# Patient Record
Sex: FEMALE | Race: WHITE | NOT HISPANIC OR LATINO | Employment: FULL TIME | URBAN - METROPOLITAN AREA
[De-identification: names, ages, dates, MRNs, and addresses within clinical notes are randomized per-mention and may not be internally consistent; named-entity substitution may affect disease eponyms.]

---

## 2017-06-16 ENCOUNTER — COMMUNICATION - HEALTHEAST (OUTPATIENT)
Dept: FAMILY MEDICINE | Facility: CLINIC | Age: 37
End: 2017-06-16

## 2017-06-19 ENCOUNTER — COMMUNICATION - HEALTHEAST (OUTPATIENT)
Dept: FAMILY MEDICINE | Facility: CLINIC | Age: 37
End: 2017-06-19

## 2017-06-19 ENCOUNTER — OFFICE VISIT - HEALTHEAST (OUTPATIENT)
Dept: FAMILY MEDICINE | Facility: CLINIC | Age: 37
End: 2017-06-19

## 2017-06-19 DIAGNOSIS — Z76.89 ENCOUNTER TO ESTABLISH CARE: ICD-10-CM

## 2017-06-19 DIAGNOSIS — F41.9 MODERATE ANXIETY: ICD-10-CM

## 2017-06-19 ASSESSMENT — MIFFLIN-ST. JEOR: SCORE: 1427.49

## 2017-07-05 ENCOUNTER — OFFICE VISIT - HEALTHEAST (OUTPATIENT)
Dept: FAMILY MEDICINE | Facility: CLINIC | Age: 37
End: 2017-07-05

## 2017-07-05 DIAGNOSIS — F41.9 MODERATE ANXIETY: ICD-10-CM

## 2017-07-05 ASSESSMENT — MIFFLIN-ST. JEOR: SCORE: 1431.57

## 2017-07-08 ENCOUNTER — RECORDS - HEALTHEAST (OUTPATIENT)
Dept: ADMINISTRATIVE | Facility: OTHER | Age: 37
End: 2017-07-08

## 2017-07-10 ENCOUNTER — COMMUNICATION - HEALTHEAST (OUTPATIENT)
Dept: ADMINISTRATIVE | Facility: CLINIC | Age: 37
End: 2017-07-10

## 2017-08-07 ENCOUNTER — OFFICE VISIT - HEALTHEAST (OUTPATIENT)
Dept: FAMILY MEDICINE | Facility: CLINIC | Age: 37
End: 2017-08-07

## 2017-08-07 DIAGNOSIS — F41.9 MODERATE ANXIETY: ICD-10-CM

## 2017-08-07 ASSESSMENT — MIFFLIN-ST. JEOR: SCORE: 1422.96

## 2017-09-11 ENCOUNTER — OFFICE VISIT - HEALTHEAST (OUTPATIENT)
Dept: FAMILY MEDICINE | Facility: CLINIC | Age: 37
End: 2017-09-11

## 2017-09-11 DIAGNOSIS — F41.9 MODERATE ANXIETY: ICD-10-CM

## 2017-09-11 ASSESSMENT — MIFFLIN-ST. JEOR: SCORE: 1417.97

## 2017-10-09 ENCOUNTER — COMMUNICATION - HEALTHEAST (OUTPATIENT)
Dept: FAMILY MEDICINE | Facility: CLINIC | Age: 37
End: 2017-10-09

## 2017-10-09 DIAGNOSIS — F41.9 MODERATE ANXIETY: ICD-10-CM

## 2018-04-01 ENCOUNTER — COMMUNICATION - HEALTHEAST (OUTPATIENT)
Dept: FAMILY MEDICINE | Facility: CLINIC | Age: 38
End: 2018-04-01

## 2018-04-01 DIAGNOSIS — F41.9 MODERATE ANXIETY: ICD-10-CM

## 2018-06-13 ENCOUNTER — COMMUNICATION - HEALTHEAST (OUTPATIENT)
Dept: SCHEDULING | Facility: CLINIC | Age: 38
End: 2018-06-13

## 2018-06-14 ENCOUNTER — COMMUNICATION - HEALTHEAST (OUTPATIENT)
Dept: TELEHEALTH | Facility: CLINIC | Age: 38
End: 2018-06-14

## 2018-06-14 ENCOUNTER — OFFICE VISIT - HEALTHEAST (OUTPATIENT)
Dept: FAMILY MEDICINE | Facility: CLINIC | Age: 38
End: 2018-06-14

## 2018-06-14 DIAGNOSIS — R10.9 ABDOMINAL DISCOMFORT: ICD-10-CM

## 2018-06-14 LAB
ERYTHROCYTE [DISTWIDTH] IN BLOOD BY AUTOMATED COUNT: 11.5 % (ref 11–14.5)
HCT VFR BLD AUTO: 44.2 % (ref 35–47)
HGB BLD-MCNC: 14.4 G/DL (ref 12–16)
MCH RBC QN AUTO: 30.8 PG (ref 27–34)
MCHC RBC AUTO-ENTMCNC: 32.7 G/DL (ref 32–36)
MCV RBC AUTO: 94 FL (ref 80–100)
PLATELET # BLD AUTO: 221 THOU/UL (ref 140–440)
PMV BLD AUTO: 8.1 FL (ref 7–10)
RBC # BLD AUTO: 4.69 MILL/UL (ref 3.8–5.4)
WBC: 5.8 THOU/UL (ref 4–11)

## 2018-06-15 ENCOUNTER — AMBULATORY - HEALTHEAST (OUTPATIENT)
Dept: LAB | Facility: CLINIC | Age: 38
End: 2018-06-15

## 2018-06-15 DIAGNOSIS — R10.9 ABDOMINAL DISCOMFORT: ICD-10-CM

## 2018-06-15 LAB
25(OH)D3 SERPL-MCNC: 24.1 NG/ML (ref 30–80)
ALBUMIN SERPL-MCNC: 4 G/DL (ref 3.5–5)
ALP SERPL-CCNC: 53 U/L (ref 45–120)
ALT SERPL W P-5'-P-CCNC: 35 U/L (ref 0–45)
ANION GAP SERPL CALCULATED.3IONS-SCNC: 8 MMOL/L (ref 5–18)
AST SERPL W P-5'-P-CCNC: 28 U/L (ref 0–40)
BILIRUB SERPL-MCNC: 0.5 MG/DL (ref 0–1)
BUN SERPL-MCNC: 13 MG/DL (ref 8–22)
CALCIUM SERPL-MCNC: 9.4 MG/DL (ref 8.5–10.5)
CHLORIDE BLD-SCNC: 105 MMOL/L (ref 98–107)
CO2 SERPL-SCNC: 27 MMOL/L (ref 22–31)
CREAT SERPL-MCNC: 0.75 MG/DL (ref 0.6–1.1)
GFR SERPL CREATININE-BSD FRML MDRD: >60 ML/MIN/1.73M2
GLUCOSE BLD-MCNC: 76 MG/DL (ref 70–125)
POTASSIUM BLD-SCNC: 4.4 MMOL/L (ref 3.5–5)
PROT SERPL-MCNC: 7 G/DL (ref 6–8)
SODIUM SERPL-SCNC: 140 MMOL/L (ref 136–145)
TSH SERPL DL<=0.005 MIU/L-ACNC: 2.07 UIU/ML (ref 0.3–5)

## 2018-06-17 LAB — G LAMBLIA AG STL QL IA: NORMAL

## 2018-06-18 ENCOUNTER — COMMUNICATION - HEALTHEAST (OUTPATIENT)
Dept: INTERNAL MEDICINE | Facility: CLINIC | Age: 38
End: 2018-06-18

## 2018-06-18 ENCOUNTER — AMBULATORY - HEALTHEAST (OUTPATIENT)
Dept: FAMILY MEDICINE | Facility: CLINIC | Age: 38
End: 2018-06-18

## 2018-06-18 LAB
H PYLORI AG STL QL IA: NORMAL
O+P STL MICRO: NORMAL
REPORT STATUS: NORMAL
SPECIMEN DESCRIPTION: NORMAL

## 2018-06-19 ENCOUNTER — COMMUNICATION - HEALTHEAST (OUTPATIENT)
Dept: FAMILY MEDICINE | Facility: CLINIC | Age: 38
End: 2018-06-19

## 2018-06-19 ENCOUNTER — COMMUNICATION - HEALTHEAST (OUTPATIENT)
Dept: LAB | Facility: CLINIC | Age: 38
End: 2018-06-19

## 2018-06-19 DIAGNOSIS — Z00.00 ANNUAL PHYSICAL EXAM: ICD-10-CM

## 2018-06-19 LAB
CHOLEST SERPL-MCNC: 144 MG/DL
HDLC SERPL-MCNC: 51 MG/DL
LDLC SERPL CALC-MCNC: 84 MG/DL
TRIGL SERPL-MCNC: 45 MG/DL

## 2018-07-03 ENCOUNTER — OFFICE VISIT - HEALTHEAST (OUTPATIENT)
Dept: FAMILY MEDICINE | Facility: CLINIC | Age: 38
End: 2018-07-03

## 2018-07-03 DIAGNOSIS — R30.0 DYSURIA: ICD-10-CM

## 2018-07-03 LAB
ALBUMIN UR-MCNC: NEGATIVE MG/DL
APPEARANCE UR: CLEAR
BACTERIA #/AREA URNS HPF: ABNORMAL HPF
BILIRUB UR QL STRIP: NEGATIVE
COLOR UR AUTO: YELLOW
GLUCOSE UR STRIP-MCNC: NEGATIVE MG/DL
HGB UR QL STRIP: NEGATIVE
KETONES UR STRIP-MCNC: NEGATIVE MG/DL
LEUKOCYTE ESTERASE UR QL STRIP: ABNORMAL
NITRATE UR QL: NEGATIVE
PH UR STRIP: 6.5 [PH] (ref 5–8)
RBC #/AREA URNS AUTO: ABNORMAL HPF
SP GR UR STRIP: <=1.005 (ref 1–1.03)
SQUAMOUS #/AREA URNS AUTO: ABNORMAL LPF
UROBILINOGEN UR STRIP-ACNC: ABNORMAL
WBC #/AREA URNS AUTO: ABNORMAL HPF

## 2018-07-04 LAB — BACTERIA SPEC CULT: NO GROWTH

## 2018-07-06 ENCOUNTER — COMMUNICATION - HEALTHEAST (OUTPATIENT)
Dept: FAMILY MEDICINE | Facility: CLINIC | Age: 38
End: 2018-07-06

## 2018-07-07 ENCOUNTER — COMMUNICATION - HEALTHEAST (OUTPATIENT)
Dept: SCHEDULING | Facility: CLINIC | Age: 38
End: 2018-07-07

## 2018-07-07 DIAGNOSIS — R10.84 ABDOMINAL PAIN, GENERALIZED: ICD-10-CM

## 2018-07-12 ENCOUNTER — COMMUNICATION - HEALTHEAST (OUTPATIENT)
Dept: FAMILY MEDICINE | Facility: CLINIC | Age: 38
End: 2018-07-12

## 2018-07-12 ENCOUNTER — OFFICE VISIT - HEALTHEAST (OUTPATIENT)
Dept: FAMILY MEDICINE | Facility: CLINIC | Age: 38
End: 2018-07-12

## 2018-07-12 DIAGNOSIS — R10.9 ABDOMINAL DISCOMFORT: ICD-10-CM

## 2018-07-12 ASSESSMENT — MIFFLIN-ST. JEOR: SCORE: 1356.28

## 2018-07-13 ENCOUNTER — COMMUNICATION - HEALTHEAST (OUTPATIENT)
Dept: FAMILY MEDICINE | Facility: CLINIC | Age: 38
End: 2018-07-13

## 2018-07-13 ENCOUNTER — RECORDS - HEALTHEAST (OUTPATIENT)
Dept: ADMINISTRATIVE | Facility: OTHER | Age: 38
End: 2018-07-13

## 2018-07-17 ENCOUNTER — RECORDS - HEALTHEAST (OUTPATIENT)
Dept: ADMINISTRATIVE | Facility: OTHER | Age: 38
End: 2018-07-17

## 2018-07-20 ENCOUNTER — RECORDS - HEALTHEAST (OUTPATIENT)
Dept: ADMINISTRATIVE | Facility: OTHER | Age: 38
End: 2018-07-20

## 2018-07-23 ENCOUNTER — RECORDS - HEALTHEAST (OUTPATIENT)
Dept: ADMINISTRATIVE | Facility: OTHER | Age: 38
End: 2018-07-23

## 2018-10-08 ENCOUNTER — COMMUNICATION - HEALTHEAST (OUTPATIENT)
Dept: FAMILY MEDICINE | Facility: CLINIC | Age: 38
End: 2018-10-08

## 2018-10-08 DIAGNOSIS — F41.9 MODERATE ANXIETY: ICD-10-CM

## 2018-11-18 ENCOUNTER — RECORDS - HEALTHEAST (OUTPATIENT)
Dept: ADMINISTRATIVE | Facility: OTHER | Age: 38
End: 2018-11-18

## 2018-12-05 ENCOUNTER — OFFICE VISIT - HEALTHEAST (OUTPATIENT)
Dept: FAMILY MEDICINE | Facility: CLINIC | Age: 38
End: 2018-12-05

## 2018-12-05 DIAGNOSIS — F41.9 MODERATE ANXIETY: ICD-10-CM

## 2018-12-05 DIAGNOSIS — Z23 NEED FOR INFLUENZA VACCINATION: ICD-10-CM

## 2018-12-17 ENCOUNTER — OFFICE VISIT - HEALTHEAST (OUTPATIENT)
Dept: FAMILY MEDICINE | Facility: CLINIC | Age: 38
End: 2018-12-17

## 2018-12-17 DIAGNOSIS — F41.9 MODERATE ANXIETY: ICD-10-CM

## 2018-12-17 DIAGNOSIS — F33.1 MODERATE EPISODE OF RECURRENT MAJOR DEPRESSIVE DISORDER (H): ICD-10-CM

## 2018-12-17 RX ORDER — TRAZODONE HYDROCHLORIDE 50 MG/1
50 TABLET, FILM COATED ORAL AT BEDTIME
Status: SHIPPED | COMMUNITY
Start: 2018-12-17

## 2018-12-18 ENCOUNTER — COMMUNICATION - HEALTHEAST (OUTPATIENT)
Dept: FAMILY MEDICINE | Facility: CLINIC | Age: 38
End: 2018-12-18

## 2018-12-27 ENCOUNTER — OFFICE VISIT - HEALTHEAST (OUTPATIENT)
Dept: FAMILY MEDICINE | Facility: CLINIC | Age: 38
End: 2018-12-27

## 2018-12-27 DIAGNOSIS — R45.851 SUICIDAL IDEATION: ICD-10-CM

## 2018-12-27 DIAGNOSIS — Z09 HOSPITAL DISCHARGE FOLLOW-UP: ICD-10-CM

## 2018-12-27 DIAGNOSIS — F33.0 MILD EPISODE OF RECURRENT MAJOR DEPRESSIVE DISORDER (H): ICD-10-CM

## 2019-06-03 ENCOUNTER — OFFICE VISIT - HEALTHEAST (OUTPATIENT)
Dept: FAMILY MEDICINE | Facility: CLINIC | Age: 39
End: 2019-06-03

## 2019-06-03 DIAGNOSIS — F41.9 MODERATE ANXIETY: ICD-10-CM

## 2019-06-04 ENCOUNTER — COMMUNICATION - HEALTHEAST (OUTPATIENT)
Dept: FAMILY MEDICINE | Facility: CLINIC | Age: 39
End: 2019-06-04

## 2019-06-04 LAB — HBV SURFACE AB SERPL IA-ACNC: POSITIVE M[IU]/ML

## 2019-06-05 ENCOUNTER — COMMUNICATION - HEALTHEAST (OUTPATIENT)
Dept: FAMILY MEDICINE | Facility: CLINIC | Age: 39
End: 2019-06-05

## 2019-06-05 LAB — HAV IGG SER QL IA: NEGATIVE

## 2019-06-07 ENCOUNTER — AMBULATORY - HEALTHEAST (OUTPATIENT)
Dept: NURSING | Facility: CLINIC | Age: 39
End: 2019-06-07

## 2019-08-14 ENCOUNTER — RECORDS - HEALTHEAST (OUTPATIENT)
Dept: ADMINISTRATIVE | Facility: OTHER | Age: 39
End: 2019-08-14

## 2019-08-14 LAB
HBA1C MFR BLD: 5.1 % (ref 0–5.6)
HPV_EXT - HISTORICAL: NORMAL

## 2019-08-20 ENCOUNTER — RECORDS - HEALTHEAST (OUTPATIENT)
Dept: HEALTH INFORMATION MANAGEMENT | Facility: CLINIC | Age: 39
End: 2019-08-20

## 2019-11-11 ENCOUNTER — RECORDS - HEALTHEAST (OUTPATIENT)
Dept: ADMINISTRATIVE | Facility: OTHER | Age: 39
End: 2019-11-11

## 2019-11-27 ENCOUNTER — COMMUNICATION - HEALTHEAST (OUTPATIENT)
Dept: INTERNAL MEDICINE | Facility: CLINIC | Age: 39
End: 2019-11-27

## 2019-12-09 ENCOUNTER — AMBULATORY - HEALTHEAST (OUTPATIENT)
Dept: NURSING | Facility: CLINIC | Age: 39
End: 2019-12-09

## 2020-02-18 ENCOUNTER — HOSPITAL ENCOUNTER (OUTPATIENT)
Dept: MAMMOGRAPHY | Facility: CLINIC | Age: 40
Discharge: HOME | End: 2020-02-18
Attending: OBSTETRICS & GYNECOLOGY

## 2020-02-18 DIAGNOSIS — Z12.31 VISIT FOR SCREENING MAMMOGRAM: ICD-10-CM

## 2020-02-20 ENCOUNTER — OFFICE VISIT - HEALTHEAST (OUTPATIENT)
Dept: FAMILY MEDICINE | Facility: CLINIC | Age: 40
End: 2020-02-20

## 2020-02-20 DIAGNOSIS — M54.50 ACUTE LEFT-SIDED LOW BACK PAIN WITHOUT SCIATICA: ICD-10-CM

## 2020-02-20 RX ORDER — IBUPROFEN 600 MG/1
600 TABLET, FILM COATED ORAL EVERY 6 HOURS PRN
Qty: 60 TABLET | Refills: 2 | Status: SHIPPED | OUTPATIENT
Start: 2020-02-20 | End: 2022-04-06

## 2020-02-21 ENCOUNTER — COMMUNICATION - HEALTHEAST (OUTPATIENT)
Dept: SCHEDULING | Facility: CLINIC | Age: 40
End: 2020-02-21

## 2020-03-04 ENCOUNTER — RECORDS - HEALTHEAST (OUTPATIENT)
Dept: ADMINISTRATIVE | Facility: OTHER | Age: 40
End: 2020-03-04

## 2020-03-06 ENCOUNTER — HOSPITAL ENCOUNTER (OUTPATIENT)
Dept: MAMMOGRAPHY | Facility: CLINIC | Age: 40
Discharge: HOME | End: 2020-03-06
Attending: OBSTETRICS & GYNECOLOGY

## 2020-03-06 ENCOUNTER — HOSPITAL ENCOUNTER (OUTPATIENT)
Dept: ULTRASOUND IMAGING | Facility: CLINIC | Age: 40
Discharge: HOME | End: 2020-03-06
Attending: OBSTETRICS & GYNECOLOGY

## 2020-03-06 DIAGNOSIS — N64.89 BREAST ASYMMETRY: ICD-10-CM

## 2020-03-20 ENCOUNTER — RECORDS - HEALTHEAST (OUTPATIENT)
Dept: RADIOLOGY | Facility: CLINIC | Age: 40
End: 2020-03-20

## 2020-05-13 ENCOUNTER — COMMUNICATION - HEALTHEAST (OUTPATIENT)
Dept: FAMILY MEDICINE | Facility: CLINIC | Age: 40
End: 2020-05-13

## 2020-05-13 DIAGNOSIS — F41.9 MODERATE ANXIETY: ICD-10-CM

## 2020-09-02 ENCOUNTER — RECORDS - HEALTHEAST (OUTPATIENT)
Dept: ADMINISTRATIVE | Facility: OTHER | Age: 40
End: 2020-09-02

## 2020-09-21 ENCOUNTER — RECORDS - HEALTHEAST (OUTPATIENT)
Dept: ADMINISTRATIVE | Facility: OTHER | Age: 40
End: 2020-09-21

## 2020-10-13 ENCOUNTER — COMMUNICATION - HEALTHEAST (OUTPATIENT)
Dept: SCHEDULING | Facility: CLINIC | Age: 40
End: 2020-10-13

## 2020-10-13 ENCOUNTER — OFFICE VISIT - HEALTHEAST (OUTPATIENT)
Dept: FAMILY MEDICINE | Facility: CLINIC | Age: 40
End: 2020-10-13

## 2020-10-13 DIAGNOSIS — F41.9 MODERATE ANXIETY: ICD-10-CM

## 2020-10-13 DIAGNOSIS — F33.1 MODERATE EPISODE OF RECURRENT MAJOR DEPRESSIVE DISORDER (H): ICD-10-CM

## 2020-10-13 ASSESSMENT — PATIENT HEALTH QUESTIONNAIRE - PHQ9: SUM OF ALL RESPONSES TO PHQ QUESTIONS 1-9: 10

## 2020-10-23 ENCOUNTER — OFFICE VISIT - HEALTHEAST (OUTPATIENT)
Dept: FAMILY MEDICINE | Facility: CLINIC | Age: 40
End: 2020-10-23

## 2020-10-23 ENCOUNTER — COMMUNICATION - HEALTHEAST (OUTPATIENT)
Dept: SCHEDULING | Facility: CLINIC | Age: 40
End: 2020-10-23

## 2020-10-23 DIAGNOSIS — F33.42 MAJOR DEPRESSIVE DISORDER, RECURRENT EPISODE, IN FULL REMISSION (H): ICD-10-CM

## 2020-10-23 DIAGNOSIS — R11.0 NAUSEA WITHOUT VOMITING: ICD-10-CM

## 2020-10-23 RX ORDER — ONDANSETRON 4 MG/1
4 TABLET, ORALLY DISINTEGRATING ORAL EVERY 8 HOURS PRN
Qty: 30 TABLET | Refills: 1 | Status: SHIPPED | OUTPATIENT
Start: 2020-10-23 | End: 2021-09-13

## 2020-10-27 ENCOUNTER — OFFICE VISIT - HEALTHEAST (OUTPATIENT)
Dept: FAMILY MEDICINE | Facility: CLINIC | Age: 40
End: 2020-10-27

## 2020-10-27 DIAGNOSIS — R11.0 NAUSEA: ICD-10-CM

## 2020-11-02 ENCOUNTER — OFFICE VISIT - HEALTHEAST (OUTPATIENT)
Dept: FAMILY MEDICINE | Facility: CLINIC | Age: 40
End: 2020-11-02

## 2020-11-02 ENCOUNTER — COMMUNICATION - HEALTHEAST (OUTPATIENT)
Dept: SCHEDULING | Facility: CLINIC | Age: 40
End: 2020-11-02

## 2020-11-02 DIAGNOSIS — F32.81 PMDD (PREMENSTRUAL DYSPHORIC DISORDER): ICD-10-CM

## 2020-11-02 DIAGNOSIS — R11.0 NAUSEA: ICD-10-CM

## 2020-11-02 DIAGNOSIS — F41.9 ANXIETY: ICD-10-CM

## 2020-12-04 ENCOUNTER — RECORDS - HEALTHEAST (OUTPATIENT)
Dept: ADMINISTRATIVE | Facility: OTHER | Age: 40
End: 2020-12-04

## 2021-01-24 ENCOUNTER — RECORDS - HEALTHEAST (OUTPATIENT)
Dept: ADMINISTRATIVE | Facility: OTHER | Age: 41
End: 2021-01-24

## 2021-01-27 ENCOUNTER — COMMUNICATION - HEALTHEAST (OUTPATIENT)
Dept: ADMINISTRATIVE | Facility: CLINIC | Age: 41
End: 2021-01-27

## 2021-01-27 DIAGNOSIS — F33.1 MODERATE EPISODE OF RECURRENT MAJOR DEPRESSIVE DISORDER (H): ICD-10-CM

## 2021-01-27 DIAGNOSIS — F41.9 MODERATE ANXIETY: ICD-10-CM

## 2021-01-27 RX ORDER — BUPROPION HYDROCHLORIDE 300 MG/1
300 TABLET ORAL EVERY MORNING
Qty: 90 TABLET | Refills: 3 | Status: SHIPPED | OUTPATIENT
Start: 2021-01-27

## 2021-01-27 RX ORDER — LEVONORGESTREL/ETHIN.ESTRADIOL 0.1-0.02MG
1 TABLET ORAL DAILY
Qty: 3 PACKAGE | Refills: 3 | Status: SHIPPED | OUTPATIENT
Start: 2021-01-27 | End: 2021-09-13

## 2021-01-27 RX ORDER — HYDROXYZINE PAMOATE 25 MG/1
25 CAPSULE ORAL AT BEDTIME
Qty: 90 CAPSULE | Refills: 1 | Status: SHIPPED | OUTPATIENT
Start: 2021-01-27

## 2021-02-10 ENCOUNTER — RECORDS - HEALTHEAST (OUTPATIENT)
Dept: ADMINISTRATIVE | Facility: OTHER | Age: 41
End: 2021-02-10

## 2021-05-04 ENCOUNTER — HOSPITAL ENCOUNTER (OUTPATIENT)
Dept: MAMMOGRAPHY | Facility: CLINIC | Age: 41
Discharge: HOME | End: 2021-05-04
Attending: OBSTETRICS & GYNECOLOGY

## 2021-05-04 DIAGNOSIS — Z12.31 VISIT FOR SCREENING MAMMOGRAM: ICD-10-CM

## 2021-05-24 ENCOUNTER — RECORDS - HEALTHEAST (OUTPATIENT)
Dept: ADMINISTRATIVE | Facility: CLINIC | Age: 41
End: 2021-05-24

## 2021-05-25 ENCOUNTER — RECORDS - HEALTHEAST (OUTPATIENT)
Dept: ADMINISTRATIVE | Facility: CLINIC | Age: 41
End: 2021-05-25

## 2021-05-26 ASSESSMENT — PATIENT HEALTH QUESTIONNAIRE - PHQ9: SUM OF ALL RESPONSES TO PHQ QUESTIONS 1-9: 10

## 2021-05-29 NOTE — TELEPHONE ENCOUNTER
Patient Returning Call  Reason for call:  Returning phone call.  Information relayed to patient:  Below message relayed to patient.  Patient has additional questions:  No  If YES, what are your questions/concerns: No additional questions at this time.  Okay to leave a detailed message?: No call back needed

## 2021-05-29 NOTE — TELEPHONE ENCOUNTER
Patient does want Hep A vaccine, order pended. Patient will be coming in on 6/7/19 at 8:00AM for nurse only visit.

## 2021-05-29 NOTE — PROGRESS NOTES
Assessment:   1. Moderate anxiety  Stable. Continue with current medication.   - buPROPion (WELLBUTRIN XL) 150 MG 24 hr tablet; Take 1 tablet (150 mg total) by mouth every morning.  Dispense: 90 tablet; Refill: 3  - Hepatitis B Surface Antibody (Anti-HBs) Vaccine Check  - Hepatitis A Immune Status     Plan:   Medications: Wellbutrin.  Continue with counseling.  Reviewed concept of anxiety as biochemical imbalance of neurotransmitters and rationale for treatment.  Instructed patient to contact office or on-call physician promptly should condition worsen or any new symptoms appear and provided on-call telephone numbers. IF THE PATIENT HAS ANY SUICIDAL OR HOMICIDAL IDEATIONS, CALL THE OFFICE, DISCUSS WITH A SUPPORT MEMBER, OR GO TO THE ER IMMEDIATELY. Patient was agreeable with this plan.  Follow up: 6 months.     Subjective:   Sofia Fletcher is a 38 y.o. female who presents for follow up of anxiety disorder. Current symptoms: none. She has continued with therapy and she is doing well at therapy. She sees her therapy few times a month. She denies current suicidal and homicidal ideation. She complains of the following side effects from the treatment: none. She will like to know if she is vaccinated against Hepatitis A and B due to a recent job change.    The following portions of the patient's history were reviewed and updated as appropriate: allergies, current medications, past family history, past medical history, past social history, past surgical history and problem list.      Objective:      /76   Pulse 62   Wt 162 lb 5 oz (73.6 kg)   SpO2 100%   BMI 28.75 kg/m     General:  alert, appears stated age and cooperative   Affect/Behavior:  full facial expressions, good grooming, good insight, normal perception, normal reasoning, normal speech pattern and content and normal thought patterns

## 2021-05-29 NOTE — PROGRESS NOTES
Patient is here for her Hep A vaccine. Patient dose have an Allergy to the allin family, per Arabella Martinez. Ok to give.     Rosa Hager CMA  8:05 AM  6/7/2019

## 2021-05-29 NOTE — TELEPHONE ENCOUNTER
----- Message from SHILO Sprague sent at 6/4/2019  4:27 PM CDT -----  You are immune against hepatitis B.  Still awaiting hepatitis A result.

## 2021-05-29 NOTE — TELEPHONE ENCOUNTER
----- Message from SHILO Sprague sent at 6/5/2019 10:01 AM CDT -----  Negative for Hepatitis A. Let us know if you want to receive immunization for Hepatitis A.

## 2021-05-31 VITALS — WEIGHT: 171.7 LBS | HEIGHT: 63 IN | BODY MASS INDEX: 30.42 KG/M2

## 2021-05-31 VITALS — BODY MASS INDEX: 30.76 KG/M2 | HEIGHT: 63 IN | WEIGHT: 173.6 LBS

## 2021-05-31 VITALS — HEIGHT: 63 IN | BODY MASS INDEX: 30.23 KG/M2 | WEIGHT: 170.6 LBS

## 2021-05-31 VITALS — BODY MASS INDEX: 30.6 KG/M2 | WEIGHT: 172.7 LBS | HEIGHT: 63 IN

## 2021-06-01 VITALS — WEIGHT: 157 LBS | BODY MASS INDEX: 27.81 KG/M2

## 2021-06-01 VITALS — WEIGHT: 158 LBS | BODY MASS INDEX: 27.99 KG/M2

## 2021-06-01 VITALS — BODY MASS INDEX: 27.82 KG/M2 | WEIGHT: 157 LBS | HEIGHT: 63 IN

## 2021-06-02 VITALS — WEIGHT: 159.9 LBS | BODY MASS INDEX: 28.33 KG/M2

## 2021-06-02 VITALS — BODY MASS INDEX: 27.71 KG/M2 | WEIGHT: 156.4 LBS

## 2021-06-02 VITALS — BODY MASS INDEX: 28.43 KG/M2 | WEIGHT: 160.5 LBS

## 2021-06-03 VITALS — BODY MASS INDEX: 28.75 KG/M2 | WEIGHT: 162.31 LBS

## 2021-06-04 VITALS — OXYGEN SATURATION: 95 % | SYSTOLIC BLOOD PRESSURE: 112 MMHG | DIASTOLIC BLOOD PRESSURE: 74 MMHG | HEART RATE: 90 BPM

## 2021-06-06 NOTE — PROGRESS NOTES
"Assessment:   1. Acute left-sided low back pain without sciatica  Nonspecific acute low back pain likely secondary to lower back strain. Discussed diagnosis and treatment options. I reviewed UC imaging and confirmed negative xray.   - cyclobenzaprine (FLEXERIL) 5 MG tablet; Take 2 tablets (10 mg total) by mouth at bedtime as needed for muscle spasms.  Dispense: 30 tablet; Refill: 1  - ibuprofen (ADVIL,MOTRIN) 600 MG tablet; Take 1 tablet (600 mg total) by mouth every 6 (six) hours as needed for pain.  Dispense: 60 tablet; Refill: 2     Plan:   Natural history and expected course discussed. Questions answered.  Proper lifting, bending technique discussed.  Stretching exercises discussed.  Short (2-4 day) period of relative rest recommended until acute symptoms improve.  Heat to affected area as needed for local pain relief.  NSAIDs per medication orders.  Muscle relaxants per medication orders.  Follow-up in 2 weeks.     Subjective:   Sofia Fletcher is a 39 y.o. female who presents for evaluation of low back pain. The patient has had no prior back problems. Symptoms have been present for 3 weeks but recently got worse after she went to the gym. Patient reports that three weeks ago while she was dead lifting she felt some strain on her lower back but she did not take it serious as she continue her usual exercise and activity until few days ago it got worse.  The pain is located in the left lumbar area and does not radiate. The pain is described as aching and occurs all day. She rates her pain as a 5 on a scale of 0-10. Symptoms are exacerbated by sitting. Symptoms are improved by stretching and standing. She has also tried ice and NSAIDs which provided no symptom relief. She has no other symptoms associated with the back pain. The patient has no \"red flag\" history indicative of complicated back pain.    The following portions of the patient's history were reviewed and updated as appropriate: allergies, current " medications, past family history, past medical history, past social history, past surgical history and problem list.    Review of Systems  A 12 point comprehensive review of systems was negative except as noted.      Objective:    Full range of motion without pain, no tenderness, no spasm, no curvature.  Normal reflexes, gait, strength and negative straight-leg raise.  Inspection and palpation: paraspinal tenderness noted left lumber .  Muscle tone and ROM exam: muscle tone normal without spasm.  Neurological: normal DTRs, muscle strength and reflexes.

## 2021-06-06 NOTE — TELEPHONE ENCOUNTER
Pt was seen in the clinic yesterday for back pain, and was started on Cyclobenzaprine and Ibuprofen. Pt took 2 5 mg tablets of the Cyclobenzaprine last night, and is calling in about swelling in her lower extremities, and abdomen today. Pt also reports numbness and tingling in her legs, like the feeling when your legs fall asleep. Pt reports her abdomen is bloated, distended and swollen also. Pt reports swelling is in her feel and ankles, and her calves. Pt reports it is painful to walk also. Pt denies chest pain, or difficulty breathing. Pt also reports she has hardly urinated since last night, and thinks she may have gained 4 pounds overnight.   Per protocol pt needs to be evaluated within 4 hours. Pt agrees with plan, and will have her  bring her to the ER now. Advised pt to call back if she has further questions or concerns.     Reason for Disposition    SEVERE leg swelling (e.g., swelling extends above knee, entire leg is swollen, weeping fluid)    Protocols used: LEG SWELLING AND EDEMA-A-AH

## 2021-06-11 NOTE — PROGRESS NOTES
Assessment:   1. Moderate anxiety  Anxiety has fully improved.  Patient will continue with current dose of Wellbutrin 75 mg twice daily.  When she is out of that prescription she will start 150 mg of Wellbutrin extended release once daily.  - buPROPion (WELLBUTRIN XL) 150 MG 24 hr tablet; Take 1 tablet (150 mg total) by mouth every morning.  Dispense: 30 tablet; Refill: 2     Plan:   Medications: Wellbutrin.  Reviewed concept of anxiety as biochemical imbalance of neurotransmitters and rationale for treatment.  Instructed patient to contact office or on-call physician promptly should condition worsen or any new symptoms appear and provided on-call telephone numbers. IF THE PATIENT HAS ANY SUICIDAL OR HOMICIDAL IDEATIONS, CALL THE OFFICE, DISCUSS WITH A SUPPORT MEMBER, OR GO TO THE ER IMMEDIATELY. Patient was agreeable with this plan.  Follow up: 4 weeks.  Spent 25 minutes (>50% of visit) discussing the risks of anxiety disorder, the  pathophysiology, etiology, risks, and principles of treatment.     Subjective:   Sofia Fletcher is a 37 y.o. female who presents for follow up of anxiety disorder. Current symptoms: none, Few days of feeling nervous, anxious or on the age and not being able to stop or control worrying just for a few days.. She denies current suicidal and homicidal ideation. She complains of the following side effects from the treatment: Urinary frequency.    The following portions of the patient's history were reviewed and updated as appropriate:   She  has no past medical history on file.  She  does not have any pertinent problems on file.  She  has a past surgical history that includes Ovarian cyst removal (Right, 2008); [place] tooth extraction (2003); and Tonsillectomy and adenoidectomy (1985).  Her family history includes Breast cancer in her mother; Cancer in her maternal grandfather; Diabetes in her father and mother; Glaucoma in her father and paternal grandmother; Heart attack in her  "paternal grandfather; Heart disease in her paternal grandfather; Hyperlipidemia in her father and mother; Hypertension in her father and mother; Obesity in her father and mother.  She  reports that she has never smoked. She has never used smokeless tobacco. She reports that she does not drink alcohol or use illicit drugs.  Current Outpatient Prescriptions   Medication Sig Dispense Refill     buPROPion (WELLBUTRIN) 75 MG tablet Take 1 tablet (75 mg total) by mouth 2 (two) times a day. 60 tablet 0     buPROPion (WELLBUTRIN XL) 150 MG 24 hr tablet Take 1 tablet (150 mg total) by mouth every morning. 30 tablet 2     No current facility-administered medications for this visit.      Current Outpatient Prescriptions on File Prior to Visit   Medication Sig     buPROPion (WELLBUTRIN) 75 MG tablet Take 1 tablet (75 mg total) by mouth 2 (two) times a day.     [DISCONTINUED] escitalopram oxalate (LEXAPRO) 10 MG tablet Take 10 mg by mouth daily.     No current facility-administered medications on file prior to visit.      She is allergic to penicillins and amoxicillin..      Objective:      /72  Pulse 77  Ht 5' 3\" (1.6 m)  Wt 173 lb 9.6 oz (78.7 kg)  LMP 07/05/2017  SpO2 98%  BMI 30.75 kg/m2   General:  alert, appears stated age and cooperative   Affect/Behavior:  full facial expressions, good grooming, good insight, normal perception, normal reasoning, normal speech pattern and content and normal thought patterns        "

## 2021-06-11 NOTE — PROGRESS NOTES
Assessment:   1. Encounter to establish care  2. Moderate anxiety  Likely secondary to recurrence of anxiety disorder.  We agreed to stop citalopram and start Wellbutrin 75 mg twice daily for the next 2 weeks and follow-up.  - buPROPion (WELLBUTRIN) 75 MG tablet; Take 1 tablet (75 mg total) by mouth 2 (two) times a day.  Dispense: 60 tablet; Refill: 0  - Thyroid Cascade; Future    Plan:   Medications: Wellbutrin.  Labs: TSH.  Continue with counseling.  Reviewed concept of anxiety as biochemical imbalance of neurotransmitters and rationale for treatment.  Instructed patient to contact office or on-call physician promptly should condition worsen or any new symptoms appear and provided on-call telephone numbers. IF THE PATIENT HAS ANY SUICIDAL OR HOMICIDAL IDEATIONS, CALL THE OFFICE, DISCUSS WITH A SUPPORT MEMBER, OR GO TO THE ER IMMEDIATELY. Patient was agreeable with this plan.  Follow up: 2 weeks.     Subjective:   Sofia Fletcher is a 36 y.o. female who presents for new evaluation and treatment of anxiety disorder and panic attacks. She has the following anxiety symptoms: difficulty concentrating, feelings of losing control, panic attacks and sweating. Patient reports that on November 7th 2014 she was hospitalized for having panic attack, she was started on escitalopram and she was on it for about 9 months and weaned off it. She believed that she had her anxiety under control though she does experience some minor anxiety but nothing as bad as she had on November 2014 or recently on Wednesday June 14, 2017. Patient reports that she was in [place] for an official assignment, while in [place] she was in [facility] going for an appointment to meet with a prosecutor.  While she was in the She developed panic attack.  Patient reports that she started having trouble breathing, abdominal pain, sweaty and she started crying.  She reported that she made a mess of herself, she did go for her appointment but she was crying in the  office when she met with the prosecutor.  She reported that she had to go into an emergency room while in [place].  She was evaluated for cardiac symptoms due to her chest pain but she was cleared of any cardiac etiology and she was informed that she was having a panic attack.  She was started on escitalopram 10 mg and she has been on the medication for the past 4 days.  Symptoms have been stable since that time. She denies current suicidal and homicidal ideation. Family history significant for anxiety. Risk factors: previous episode of depression. Previous treatment includes Lexapro. She complains of the following medication side effects: none.  Patient reported that her sister takes Wellbutrin and she likes it and she feels that she should be on Wellbutrin as well.    The following portions of the patient's history were reviewed and updated as appropriate:   She  has no past medical history on file.  She  does not have any pertinent problems on file.  She  has a past surgical history that includes Ovarian cyst removal (Right, 2008); [place] tooth extraction (2003); and Tonsillectomy and adenoidectomy (1985).  Her family history includes Breast cancer in her mother; Cancer in her maternal grandfather; Diabetes in her father and mother; Glaucoma in her father and paternal grandmother; Heart attack in her paternal grandfather; Heart disease in her paternal grandfather; Hyperlipidemia in her father and mother; Hypertension in her father and mother; Obesity in her father and mother.  She  reports that she has never smoked. She has never used smokeless tobacco. She reports that she does not drink alcohol or use illicit drugs.  Current Outpatient Prescriptions   Medication Sig Dispense Refill     escitalopram oxalate (LEXAPRO) 10 MG tablet Take 10 mg by mouth daily.       buPROPion (WELLBUTRIN) 75 MG tablet Take 1 tablet (75 mg total) by mouth 2 (two) times a day. 60 tablet 0     No current facility-administered  "medications for this visit.      No current outpatient prescriptions on file prior to visit.     No current facility-administered medications on file prior to visit.      She is allergic to penicillins and amoxicillin..    Review of Systems  A 12 point comprehensive review of systems was negative except as noted.      Objective:   /72  Pulse 74  Ht 5' 3\" (1.6 m)  Wt 172 lb 11.2 oz (78.3 kg)  LMP 06/15/2017  SpO2 98%  Breastfeeding? No  BMI 30.59 kg/m2  General appearance: alert, appears stated age and cooperative  Head: Normocephalic, without obvious abnormality, atraumatic  Pulses: 2+ and symmetric  Skin: Skin color, texture, turgor normal. No rashes or lesions  Lymph nodes: Cervical, supraclavicular, and axillary nodes normal.  Neurologic: Grossly normal      Time: total time spent with the patient was 45 minutes of which >50% was spent in counseling and coordination of care    "

## 2021-06-12 NOTE — PROGRESS NOTES
"Sofia Fletcher is a 40 y.o. female who is being evaluated via a billable telephone visit.      The patient has been notified of following:     \"This telephone visit will be conducted via a call between you and your physician/provider. We have found that certain health care needs can be provided without the need for a physical exam.  This service lets us provide the care you need with a short phone conversation.  If a prescription is necessary we can send it directly to your pharmacy.  If lab work is needed we can place an order for that and you can then stop by our lab to have the test done at a later time.    Telephone visits are billed at different rates depending on your insurance coverage. During this emergency period, for some insurers they may be billed the same as an in-person visit.  Please reach out to your insurance provider with any questions.    If during the course of the call the physician/provider feels a telephone visit is not appropriate, you will not be charged for this service.\"    Patient has given verbal consent to a Telephone visit? Yes    What phone number would you like to be contacted at? [phone]    Patient would like to receive their AVS by AVS Preference: Mail a copy.    Additional provider notes:     Assessment/Plan:  1. Nausea  Improved symptoms. Likely secondary to increased Wellbutrin. Patient will continue medication as prescribed.    Subjective:  Sofia Monroy, 40 years old female patient with history of anxiety presents via a phone visit for a follow up. Patient reports that after taking increased dose of Wellbutrin she became very nauseous and had to go to the [facility] where she was examined and treated with Zofran and led to constipation. She reports that she took laxatives and after few days she had BM and she is feeling better. All symptom has resolved and her anxiety is well managed as well.     Phone call duration:  8 minutes    Jacinta Quach MA  "

## 2021-06-12 NOTE — PROGRESS NOTES
Assessment:   1. Moderate anxiety  Anxiety is well controlled at this time.  Patient was encouraged to continue taking her medications and she should also continue seeing her therapiest.  Patient will follow up in 30 days, and  if anxiety is still moderately controlled we will increase Wellbutrin from 150 mg to 300 mg extended release daily.     Plan:   Medications: Wellbutrin.  Continue with counseling.  Reviewed concept of anxiety as biochemical imbalance of neurotransmitters and rationale for treatment.  Instructed patient to contact office or on-call physician promptly should condition worsen or any new symptoms appear and provided on-call telephone numbers. IF THE PATIENT HAS ANY SUICIDAL OR HOMICIDAL IDEATIONS, CALL THE OFFICE, DISCUSS WITH A SUPPORT MEMBER, OR GO TO THE ER IMMEDIATELY. Patient was agreeable with this plan.  Follow up: 1 month.     Subjective:   Sofia Fletcher is a 37 y.o. female who presents for follow up of anxiety disorder. Current symptoms: Feeling nervous, anxious, on the edge of a half of the days, worrying too much and trouble relaxing several days in the past 2 weeks. She denies current suicidal and homicidal ideation. She complains of the following side effects from the treatment: none. Patient stated that she still has some mild-to-moderate anxiety she has not had any panic attacks but she is scared that when she gets very anxious that she she might say things that might hurt people and she does not like to hurt people. She sees her therapist about three times a month and she is working on something that might help her with her anxiety.     The following portions of the patient's history were reviewed and updated as appropriate:   She  has no past medical history on file.  She  does not have any pertinent problems on file.  She  has a past surgical history that includes Ovarian cyst removal (Right, 2008); [place] tooth extraction (2003); and Tonsillectomy and adenoidectomy  "(1985).  Her family history includes Breast cancer in her mother; Cancer in her maternal grandfather; Diabetes in her father and mother; Glaucoma in her father and paternal grandmother; Heart attack in her paternal grandfather; Heart disease in her paternal grandfather; Hyperlipidemia in her father and mother; Hypertension in her father and mother; Obesity in her father and mother.  She  reports that she has never smoked. She has never used smokeless tobacco. She reports that she does not drink alcohol or use illicit drugs.  Current Outpatient Prescriptions   Medication Sig Dispense Refill     buPROPion (WELLBUTRIN XL) 150 MG 24 hr tablet Take 1 tablet (150 mg total) by mouth every morning. 30 tablet 2     buPROPion (WELLBUTRIN) 75 MG tablet Take 1 tablet (75 mg total) by mouth 2 (two) times a day. 60 tablet 0     No current facility-administered medications for this visit.      Current Outpatient Prescriptions on File Prior to Visit   Medication Sig     buPROPion (WELLBUTRIN XL) 150 MG 24 hr tablet Take 1 tablet (150 mg total) by mouth every morning.     buPROPion (WELLBUTRIN) 75 MG tablet Take 1 tablet (75 mg total) by mouth 2 (two) times a day.     No current facility-administered medications on file prior to visit.      She is allergic to penicillins and amoxicillin..      Objective:      /74  Pulse 74  Ht 5' 3\" (1.6 m)  Wt 171 lb 11.2 oz (77.9 kg)  SpO2 99%  BMI 30.42 kg/m2   General:  alert, appears stated age and cooperative   Affect/Behavior:  full facial expressions, good grooming, good insight, normal perception, normal reasoning, normal speech pattern and content and normal thought patterns        "

## 2021-06-12 NOTE — PROGRESS NOTES
"Sofia Fletcher is a 40 y.o. female who is being evaluated via a billable video visit.      The patient has been notified of following:     \"This video visit will be conducted via a call between you and your physician/provider. We have found that certain health care needs can be provided without the need for an in-person physical exam.  This service lets us provide the care you need with a video conversation.  If a prescription is necessary we can send it directly to your pharmacy.  If lab work is needed we can place an order for that and you can then stop by our lab to have the test done at a later time.    Video visits are billed at different rates depending on your insurance coverage. Please reach out to your insurance provider with any questions.    If during the course of the call the physician/provider feels a video visit is not appropriate, you will not be charged for this service.\"    Patient has given verbal consent to a Video visit? Yes  How would you like to obtain your AVS? AVS Preference: Mail a copy.    Will anyone else be joining your video visit? No        Video Start Time: 1:46 PM    Additional provider notes:   Sofia Fletcher 40 y.o.. female with   Chief Complaint   Patient presents with     Nausea     terrible pain x 2.5 weeks, may be due to increase to Wellbutrin took Zofran and helped a little        S:    Nausea and epigastrium discomfort and bloatiness x 2 wks  No vomiting  Zofran has been helping but no today   This started about 2-3 wks ago when wellbutrin was increased from 150mg to 300mg due to anxiety  Anxiety has not gotten better however 1 week after the increase in dose, she developed nausea   thinks she's having anxiety. Was not able to concentrate and was crying earlier.  Felt better when she was laying down and relaxed.  Years ago she tried lexapro but it caused her to have suicidal ideation  Left message with therapist and awaiting therapy  Take vistaril for sleep  Taking " OCP for PMDD     O:  Constitutional: Patient is oriented to person, place, and time. Patient appears well-developed and well-nourished. No distress.   Head: Normocephalic and atraumatic.   Right Ear: External ear normal.   Left Ear: External ear normal.   Nose: Nose normal.   Eyes: Conjunctivae and EOM are normal. Right eye exhibits no discharge. Left eye exhibits no discharge. No scleral icterus.   Neurological: Patient is alert and oriented to person, place, and time.   The patient has good eye contact.  No psychomotor retardation or stereotypical behaviors.  Speech was regular rate, regular rhythm, adequate responses.  Mood was stable and affect was congruent mood.  No suicidal or homicidal intent.  No hallucination.      A/P:    Nausea  Advised vistaril scheduled to three times a day  Consider reducing wellbutrin back to 150mg, given that her symptoms started when the dose was increased and also given that anxiety symptoms did not improve with the higher dose  Continue with zofran (but all stool softener as constipation can worsen nausea)  F/u in 1-2 wks    Anxiety  See plan above  Schedule vistaril to three times a day  Consider reducing wellbutrin back to 150mg  Consider adding buspar  F/u in 1-2 wks  Motivational interviewing was utilized today.  Modified cognitive behavioral therapy was performed with counseling on internal locus of control.     PMDD (premenstrual dysphoric disorder)  Using OCP given by gynecology  Consider progesterone      Video-Visit Details    Type of service:  Video Visit    Video End Time (time video stopped): 3:11 PM  Originating Location (pt. Location): Home    Distant Location (provider location):  [facility]     Platform used for Video Visit: Elsy Cervantes MD

## 2021-06-12 NOTE — PROGRESS NOTES
"Sofia Fletcher is a 40 y.o. female who is being evaluated via a billable telephone visit.      The patient has been notified of following:     \"This telephone visit will be conducted via a call between you and your physician/provider. We have found that certain health care needs can be provided without the need for a physical exam.  This service lets us provide the care you need with a short phone conversation.  If a prescription is necessary we can send it directly to your pharmacy.  If lab work is needed we can place an order for that and you can then stop by our lab to have the test done at a later time.    Telephone visits are billed at different rates depending on your insurance coverage. During this emergency period, for some insurers they may be billed the same as an in-person visit.  Please reach out to your insurance provider with any questions.    If during the course of the call the physician/provider feels a telephone visit is not appropriate, you will not be charged for this service.\"    Patient has given verbal consent to a Telephone visit? Yes    What phone number would you like to be contacted at? [phone]    Patient would like to receive their AVS by AVS Preference: Mail a copy.    Additional provider notes:     Assessment/Plan:  1. Moderate anxiety  2. Moderate episode of recurrent major depressive disorder (H)  Medications: Wellbutrin.  Continue with counseling.  Reviewed concept of anxiety as biochemical imbalance of neurotransmitters and rationale for treatment.  Instructed patient to contact office or on-call physician promptly should condition worsen or any new symptoms appear and provided on-call telephone numbers. IF THE PATIENT HAS ANY SUICIDAL OR HOMICIDAL IDEATIONS, CALL THE OFFICE, DISCUSS WITH A SUPPORT MEMBER, OR GO TO THE ER IMMEDIATELY. Patient was agreeable with this plan.  Follow up: 2 weeks.   - hydrOXYzine pamoate (VISTARIL) 25 MG capsule; Take 1 capsule (25 mg total) by " mouth at bedtime.  Dispense: 90 capsule; Refill: 1  - buPROPion (WELLBUTRIN XL) 300 MG 24 hr tablet; Take 1 tablet (300 mg total) by mouth every morning.  Dispense: 90 tablet; Refill: 3    Subjective:   Sofia Fletcher is a 40 y.o. female who presents for follow up of anxiety disorder. Current symptoms: irritable. She denies current suicidal and homicidal ideation. She complains of the following side effects from the treatment: none.    The following portions of the patient's history were reviewed and updated as appropriate: allergies, current medications, past family history, past medical history, past social history, past surgical history and problem list.          Phone call duration:  17 minutes    Marquise Rubio CMA

## 2021-06-12 NOTE — PROGRESS NOTES
"  Office Visit - Follow Up   Sofia Fletcher   37 y.o. female    Date of Visit: 9/11/2017    Chief Complaint   Patient presents with     Follow-up        Assessment and Plan   1. Moderate anxiety  Anxiety is well controlled at this time.  Patient was encouraged to continue taking her medications and she should also continue seeing her therapiest.  Patient will follow up in 3 months,     Follow-up in 3 months or sooner for acute illness       History of Present Illness   This 37 y.o. old with history of anxiety presents to the clinic today for follow-up.  Patient stated that she is doing very well.  She stated that she consulted with the dietitian and she is being working with the dietitian for her diet.  She stated that she is detoxing and for the past 9 days that she has been feeling so well and that her anxiety has reduced.  She also stated that some of her abdominal symptoms has also reduced.  She stated that she will be on this program for about 12 weeks and she will reevaluate.  She denied any suicidal thoughts, depression, increased anxiety, and insomnia.    Review of Systems: A comprehensive review of systems was negative except as noted.     Medications, Allergies and Problem List   Reviewed and updated     Physical Exam   General Appearance: Well groomed    /76  Pulse 78  Ht 5' 3\" (1.6 m)  Wt 170 lb 9.6 oz (77.4 kg)  LMP 08/18/2017  SpO2 98%  BMI 30.22 kg/m2    Psych: appropriate affective, answers all of my questions appropriately. No hallucinations, delusion, or suicidal ideations.       Additional Information   Current Outpatient Prescriptions   Medication Sig Dispense Refill     buPROPion (WELLBUTRIN XL) 150 MG 24 hr tablet Take 1 tablet (150 mg total) by mouth every morning. 30 tablet 2     buPROPion (WELLBUTRIN) 75 MG tablet Take 1 tablet (75 mg total) by mouth 2 (two) times a day. 60 tablet 0     No current facility-administered medications for this visit.      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

## 2021-06-12 NOTE — PROGRESS NOTES
"Sofia Fletcher is a 40 y.o. female who is being evaluated via a billable telephone visit.      The patient has been notified of following:     \"This telephone visit will be conducted via a call between you and your physician/provider. We have found that certain health care needs can be provided without the need for a physical exam.  This service lets us provide the care you need with a short phone conversation.  If a prescription is necessary we can send it directly to your pharmacy.  If lab work is needed we can place an order for that and you can then stop by our lab to have the test done at a later time.    Telephone visits are billed at different rates depending on your insurance coverage. During this emergency period, for some insurers they may be billed the same as an in-person visit.  Please reach out to your insurance provider with any questions.    If during the course of the call the physician/provider feels a telephone visit is not appropriate, you will not be charged for this service.\"    Patient has given verbal consent to a Telephone visit? Yes    What phone number would you like to be contacted at? [phone]    Patient would like to receive their AVS by email: [email or web address]    Nausea and decreased appetite for the past week. Started taking a higher dose of Wellbutrin. Bumped up to 300 mg. She is pretty sure this is why she feels nauseous. She wants to give it longer to see if the symptoms will subside, and not change the medication dose, but she is wondering if she can get some kind of medication for the nausea.     Has taken omeprazole and it does nothing for her nausea.    Tried an selective serotonin reuptake inhibitor and it made her suicidal so she doesn't want to try that again.    Has been having 3-5 bowel movements a day since increasing wellbutrin.     Assessment/Plan:  1. Nausea without vomiting: will prescribe zofran for her to use until symptoms subside  - ondansetron " (ZOFRAN-ODT) 4 MG disintegrating tablet; Take 1 tablet (4 mg total) by mouth every 8 (eight) hours as needed for nausea.  Dispense: 30 tablet; Refill: 1    2. Major depressive disorder, recurrent episode, in full remission (H): she does have a follow up scheduled with her PCP and I encouraged her to talk with him if her symptoms are still not improving after a couple weeks.         Phone call duration:  6 minutes    Destinee Mcnamara MD

## 2021-06-12 NOTE — TELEPHONE ENCOUNTER
Who is calling:  PATIENT  Reason for Call:  PT had a phy with her obgyn doctor along with labs and requested that information to be sent over to her provider and wants to make sure provider received that information   Date of last appointment with primary care: na  Okay to leave a detailed message: Yes

## 2021-06-12 NOTE — TELEPHONE ENCOUNTER
COVID 19 Nurse Triage Plan/Patient Instructions    Please be aware that novel coronavirus (COVID-19) may be circulating in the community. If you develop symptoms such as fever, cough, or SOB or if you have concerns about the presence of another infection including coronavirus (COVID-19), please contact your health care provider or visit [email or web address].     Disposition/Instructions    Virtual Visit with provider recommended. Reference Visit Selection Guide.    Thank you for taking steps to prevent the spread of this virus.  o Limit your contact with others.  o Wear a simple mask to cover your cough.  o Wash your hands well and often.    Resources    [facility]: About COVID-19: [email or web address]    CDC: What to Do If You're Sick: www.cdc.gov/coronavirus/2019-ncov/about/steps-when-sick.html    CDC: Ending Home Isolation: www.cdc.gov/coronavirus/2019-ncov/hcp/disposition-in-home-patients.html     CDC: Caring for Someone: www.cdc.gov/coronavirus/2019-ncov/if-you-are-sick/care-for-someone.html     [facility]: Interim Guidance for Hospital Discharge to Home: www.[facility].[place]/diseases/coronavirus/hcp/hospdischarge.pdf    [facility] clinical trials (COVID-19 research studies): [email or web address]-trials     Below are the COVID-19 hotlines at the [place] Department [facility] ([facility]). Interpreters are available.   o For health questions: Call 167-528-4990 or 1-946.948.7074 (7 a.m. to 7 p.m.)  o For questions about schools and childcare: Call 910-922-6712 or 1-993.418.6151 (7 a.m. to 7 p.m.)           RN triage   Call from pt   Pt states she has been more stressed and increased Wellbutrin to 300 mg -- this week   Having lots of nausea this week -- no vomiting -- sometimes dry heaves   Has taken prilosec this week -- no change- pt states does not think she is having reflux   Drinking fluids good -- U.O. good --   No fever   T = 98.7  No abd pain   Having some more stools -- no  diarrhea / stools soft   Reviewed home care advice   Transferred to    Bria Alfred RN [facility] RN triage      Reason for Disposition    Patient wants to be seen    Additional Information    Negative: Shock suspected (e.g., cold/pale/clammy skin, too weak to stand, low BP, rapid pulse)    Negative: Sounds like a life-threatening emergency to the triager    Negative: Nausea or vomiting and pregnancy < 20 weeks    Negative: Menstrual Period - Missed or Late (i.e., pregnancy suspected)    Negative: Heat exhaustion suspected (i.e., dehydration from heat exposure)    Negative: Anxiety or stress suspected (i.e., nausea with anxiety attacks or stressful situations)    Negative: Traumatic Brain Injury (TBI) suspected    Negative: Vomiting occurs    Negative: Other symptom is present, see that guideline.  (e.g., chest pain, headache, dizziness, abdominal pain, colds, sore throat, etc.).    Negative: Unable to walk, or can only walk with assistance (e.g., requires support)    Negative: Difficulty breathing    Negative: Insulin-dependent diabetes (Type I) and glucose > 400 mg/dL (22 mmol/L)    Negative: Drinking very little and dehydration suspected (e.g., no urine > 12 hours, very dry mouth, very lightheaded)    Negative: Patient sounds very sick or weak to the triager    Negative: Fever > 100.0 F (37.8 C) and diabetes mellitus or weak immune system (e.g., HIV positive, cancer chemo, splenectomy, chronic steroids)    Negative: Fever > 100.0 F (37.8 C) and bedridden (e.g., nursing home patient, CVA, chronic illness, recovering from surgery)    Negative: Fever > 101 F (38.3 C) and age > 60    Negative: Fever > 104 F (40 C)    Negative: Yellowish color of the skin or white of the eye (i.e., jaundice)    Negative: Fever present > 3 days (72 hours)    Protocols used: NAUSEA-A-OH

## 2021-06-12 NOTE — TELEPHONE ENCOUNTER
They increased dose of welbutrin. Last time she had it was over one year ago and had stomach problem until body got used to it.  Had stomach pain on Friday. Prescribed zofran. Okay over the weekend. Tuesday thru Saturday didn't need any zofran. It came on again last night and now it's really bad. Temp 99.7. Took Zofran one hour ago and nausea is really bad. Pain has been coming and going for over 24 hours. She is crying. I connected her to scheduling for an appointment today and advised urgent care if they can't get her into the clinic today.  Chhaya Mullins RN  [facility]      Reason for Disposition    MILD pain that comes and goes (cramps) lasts > 24 hours    Additional Information    Negative: Passed out (i.e., fainted, collapsed and was not responding)    Negative: Shock suspected (e.g., cold/pale/clammy skin, too weak to stand, low BP, rapid pulse)    Negative: Visible sweat on face or sweat is dripping down    Negative: Chest pain    Negative: SEVERE abdominal pain (e.g., excruciating)     Pain at not bad, nausea at ten. Took the zofran today one hour ago.    Negative: Pain lasting > 10 minutes and over 50 years old    Negative: Pain lasting > 10 minutes and over 40 years old and associated chest, arm, neck, upper back, or jaw pain    Negative: Pain lasting > 10 minutes and over 35 years old and at least one cardiac risk factor    Negative: Pain lasting > 10 minutes and history of heart disease (i.e., heart attack, bypass surgery, angina, angioplasty, CHF)    Negative: Recent injury to the abdomen    Negative: Vomiting red blood or black (coffee ground) material    Negative: Bloody, black, or tarry bowel movements    Negative: Pregnant > 24 weeks and hand or face swelling    Negative: Constant abdominal pain lasting > 2 hours    Negative: Vomiting bile (green color)    Negative: Patient sounds very sick or weak to the triager    Negative: Vomiting and abdomen looks much more swollen than  usual    Negative: White of the eyes have turned yellow (i.e.,  jaundice)    Negative: Fever > 103 F (39.4 C)    Negative: Fever > 101 F (38.3 C) and over 60 years of age    Negative: Fever > 100.0 F (37.8 C) and has diabetes mellitus or a weak immune system (e.g., HIV positive, cancer chemotherapy, organ transplant, splenectomy, chronic steroids)    Negative: Fever > 100.0 F (37.8 C) and bedridden (e.g., nursing home patient, stroke, chronic illness, recovering from surgery)    Negative: Age > 60 years    Negative: Patient wants to be seen     99.8    Protocols used: ABDOMINAL PAIN - UPPER-A-OH

## 2021-06-14 NOTE — TELEPHONE ENCOUNTER
Reason for Call:  Medication or medication refill:    Do you use a [facility]?  Name of the pharmacy and phone number for the current request[facility] [place] [facility]    Name of the medication requested:   buPROPion 150 mg  hydrOXYzine pamoate  levonorgestrel-ethinyl estradiol      Other request: Patient requesting to switch to [facility]. States the above 3 medications are the ones she needs sent over.     Can we leave a detailed message on this number? Yes    Phone number patient can be reached at:   Cell number on file:    Telephone Information:   Mobile [phone]     Best Time: anytime    Call taken on 1/27/2021 at 9:01 AM by Rosibel Riley

## 2021-06-16 PROBLEM — R14.0 ABDOMINAL DISTENSION, GASEOUS: Status: ACTIVE | Noted: 2018-07-17

## 2021-06-16 PROBLEM — R45.851 SUICIDAL IDEATION: Status: ACTIVE | Noted: 2018-12-29

## 2021-06-16 PROBLEM — R10.13 EPIGASTRIC ABDOMINAL PAIN: Status: ACTIVE | Noted: 2018-07-17

## 2021-06-16 PROBLEM — R11.0 NAUSEA WITHOUT VOMITING: Status: ACTIVE | Noted: 2018-07-17

## 2021-06-16 PROBLEM — F33.42 MAJOR DEPRESSIVE DISORDER, RECURRENT EPISODE, IN FULL REMISSION (H): Status: ACTIVE | Noted: 2020-10-23

## 2021-06-16 PROBLEM — F41.9 MODERATE ANXIETY: Status: ACTIVE | Noted: 2017-06-19

## 2021-06-18 NOTE — PROGRESS NOTES
Assessment:   1. Abdominal discomfort  Patient has history of stomach ulcer 10 years ago and she was treated with PPI. Symptoms are about the same. Patient also started her menstrual period. We get stool sample to rule out giardia, h. Pylori, Ova and parasite. Review hepatic and renal function.   - Giardia Detection, Stool; Future  - H. pylori Antigen, Stool(HPSAG); Future  - Ova and Parasite, Stool; Future  - Comprehensive Metabolic Panel  - Vitamin D, Total (25-Hydroxy)  - HM2(CBC w/o Differential)  - Thyroid Stimulating Hormone (TSH)  - omeprazole (PRILOSEC) 40 MG capsule; Take 1 capsule (40 mg total) by mouth daily.  Dispense: 30 capsule; Refill: 0     Plan:   The diagnosis was discussed with the patient and evaluation and treatment plans outlined.  See orders for lab and imaging studies.  Reassured patient that symptoms are almost certainly benign and self-resolving.  Adhere to simple, bland diet.  Adhere to low fat diet.  H. Pylori regimen; see orders.  Further follow-up plans will be based on outcome of lab/imaging studies; see orders.     Subjective:   Sofia Fletcher is a 37 y.o. female who presents for evaluation of abdominal pain. Onset was 5 weeks ago. Symptoms have been unchanged. The pain is described as cramping, dull and pressure-like, and is 3/10 in intensity. Pain is located in the periumbilical region without radiation.  Aggravating factors: eating.  Alleviating factors: none. Associated symptoms: none. The patient denies anorexia, arthralagias, belching, chills, constipation, diarrhea, dysuria, fever, flatus, frequency, hematuria, melena, myalgias, nausea, sweats and vomiting.  The following portions of the patient's history were reviewed and updated as appropriate: allergies, current medications, past family history, past medical history, past social history, past surgical history and problem list.    Review of Systems  A 12 point comprehensive review of systems was negative except as  noted.       Objective:   /72  Pulse 80  Wt 158 lb (71.7 kg)  BMI 27.99 kg/m2  General appearance: alert, appears stated age and cooperative  Head: Normocephalic, without obvious abnormality, atraumatic  Throat: lips, mucosa, and tongue normal; teeth and gums normal  Abdomen: abnormal findings:  moderate tenderness in the epigastrium, in the periumbilical area, in the RLQ and in the LLQ  Pulses: 2+ and symmetric  Skin: Skin color, texture, turgor normal. No rashes or lesions  Lymph nodes: Cervical, supraclavicular, and axillary nodes normal.  Neurologic: Grossly normal

## 2021-06-19 NOTE — PROGRESS NOTES
"  Assessment:   1. Abdominal discomfort  Recommend that patient see gastroenterologist for upper endoscopy   - omeprazole (PRILOSEC) 20 MG capsule; Take 1 capsule (20 mg total) by mouth 2 (two) times a day before meals.  Dispense: 60 capsule; Refill: 0     Plan:   The diagnosis was discussed with the patient and evaluation and treatment plans outlined.  Reassured patient that symptoms are almost certainly benign and self-resolving.  Adhere to simple, bland diet.  Adhere to low fat diet.     Subjective:   Sofia Fletcher is a 38 y.o. female who presents for a follow up of abdominal pain. She was seen almost a month ago for the same concern and lab work was done to rule out h.pylori, liver and kidney disease. Patient was then started on omeprazole 40 mg daily. She reports that pain is better in the morning but as soon its 4 pm she start experiencing pain again. Pain is located in the epigastric area.   The pain is described as aching and sharp, and is 4/10 in intensity.  Aggravating factors: none.  Alleviating factors: omeprazole. Associated symptoms: nausea. The patient denies anorexia, arthralagias, belching, chills, constipation, diarrhea, dysuria, fever, flatus, frequency, headache and hematochezia.  The following portions of the patient's history were reviewed and updated as appropriate: allergies, current medications, past family history, past medical history, past social history, past surgical history and problem list.    Review of Systems  A 12 point comprehensive review of systems was negative except as noted.       Objective:      /80  Pulse 82  Ht 5' 3\" (1.6 m)  Wt 157 lb (71.2 kg)  SpO2 98%  BMI 27.81 kg/m2  Pulses: 2+ and symmetric  Neurologic: Grossly normal   "

## 2021-06-19 NOTE — PROGRESS NOTES
"ASSESSMENT:  1. Dysuria  Acute symptoms, urinalysis is mildly suggestive of a urinary tract infection.  - Urinalysis Macro & Micro  - Culture, Urine        PLAN:  1.  Urinalysis with micro and urine culture results pending.  2.  Bactrim double strength 1 tablet twice daily times 3 days.  3.  We will also consume plenty of liquids.       Orders Placed This Encounter   Procedures     Culture, Urine     Urinalysis Macro & Micro     There are no discontinued medications.    No Follow-up on file.    CHIEF COMPLAINT:  Chief Complaint   Patient presents with     Urinary Tract Infection     has alot of pressure and frequency - no pain        SUBJECTIVE:  Sofia is a 38 y.o. female who presents with urinary frequency. She does not have a recurrent history of bladder infections. She feels like something is \"pulling her urethra out of her body\". Also experiencing urinary frequency with large amounts of urine. She has been experiencing these symptoms for 4-5 days. She has tried drinking cranberry juice.     REVIEW OF SYSTEMS:   All other systems are negative.    PFSH:  Immunization History   Administered Date(s) Administered     Influenza, seasonal,quad inj 36+ mos 09/11/2017     Social History     Social History     Marital status:      Spouse name: N/A     Number of children: N/A     Years of education: N/A     Occupational History     Not on file.     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  Family History   Problem Relation Age of Onset     Obesity Mother      Diabetes Mother      Hypertension Mother      Hyperlipidemia Mother      Breast cancer Mother      Obesity Father      Diabetes Father      Hypertension " Father      Hyperlipidemia Father      Glaucoma Father      Cancer Maternal Grandfather      Glaucoma Paternal Grandmother      Heart disease Paternal Grandfather      Heart attack Paternal Grandfather        MEDICATIONS:  Current Outpatient Prescriptions   Medication Sig Dispense Refill     buPROPion (WELLBUTRIN XL) 150 MG 24 hr tablet TAKE 1 TABLET (150 MG TOTAL) BY MOUTH EVERY MORNING. 60 tablet 2     omeprazole (PRILOSEC) 40 MG capsule Take 1 capsule (40 mg total) by mouth daily. 30 capsule 0     sulfamethoxazole-trimethoprim (BACTRIM DS) 800-160 mg per tablet Take 1 tablet by mouth 2 (two) times a day for 3 days. 6 tablet 0     No current facility-administered medications for this visit.        TOBACCO USE:  History   Smoking Status     Never Smoker   Smokeless Tobacco     Never Used       VITALS:  Vitals:    07/03/18 1520   BP: 118/72   Pulse: 70   Weight: 157 lb (71.2 kg)     Wt Readings from Last 3 Encounters:   07/03/18 157 lb (71.2 kg)   06/14/18 158 lb (71.7 kg)   09/11/17 170 lb 9.6 oz (77.4 kg)       PHYSICAL EXAM:  Constitutional:   Reveals a well appearing, talkative female.  Vitals: per nursing notes.  Neuro:  Alert and oriented. Cranial nerves, motor, sensory exams are intact.  No gross focal deficits.  Psychiatric:  Memory intact, mood appropriate.      DATA REVIEWED:  Additional History from Old Records Summarized (2): None.  Decision to Obtain Records (1): None.  Radiology Tests Summarized or Ordered (1): None.  Labs Reviewed or Ordered (1): Ordered labs today. Reviewed UA.   Medicine Test Summarized or Ordered (1): None.  Independent Review of EKG, X-RAY, or RAPID STREP (2 each): None.    The visit lasted a total of 6 minutes face to face with the patient. Over 50% of the time was spent counseling and educating the patient about UTI.    Maria Del Carmen JESSICA, am scribing for and in the presence of, Dr. Kline.    Dr. Eliud JESSICA, personally performed the services described in this documentation, as scribed  by Maria Del Carmen Roth in my presence, and it is both accurate and complete.    Total data points: 1

## 2021-06-22 NOTE — PROGRESS NOTES
ASSESSMENT:  1. Hospital discharge follow-up  2. Suicidal ideation  3. Mild episode of recurrent major depressive disorder (H)  Medications: Wellbutrin  Continue with counseling.  Reviewed concept of anxiety as biochemical imbalance of neurotransmitters and rationale for treatment.  Instructed patient to contact office or on-call physician promptly should condition worsen or any new symptoms appear and provided on-call telephone numbers. IF THE PATIENT HAS ANY SUICIDAL OR HOMICIDAL IDEATIONS, CALL THE OFFICE, DISCUSS WITH A SUPPORT MEMBER, OR GO TO THE ER IMMEDIATELY. Patient was agreeable with this plan.  Follow up: 2 weeks.  Spent 25 minutes (>50% of visit) discussing the risks of anxiety disorder and depression, the  pathophysiology, etiology, risks, and principles of treatment.       I have reconciled all medications with attention to the pre-hospital regimen.   I have instructed patient in self-management  I have explained warning signs and how to respond  I provided instructions for seeking emergency and non-emergency after-hour cares  Copy of reconciled was provided for the patient    PLAN:  Patient Instructions   Follow up with scheduled appointment with psychologist  Continue with current medication.       No orders of the defined types were placed in this encounter.    Medications Discontinued During This Encounter   Medication Reason     escitalopram oxalate (LEXAPRO) 10 MG tablet Therapy completed       Return in about 2 weeks (around 1/10/2019) for Anxiety and depression.    I used teach-back during this visit to help patient understand home care instruction and medication use.    CHIEF COMPLAINT:  Chief Complaint   Patient presents with     Follow-up     Inpatient F/U       HISTORY OF PRESENT ILLNESS:  Sofia is a 38 y.o. female presenting to the clinic today for a hospital follow up. Patient was placed on 72 hours hold after she found her self with the thought of killing herself. Patient reports  that after her last visit with writer she picked up prescribed Lexapro for her depression and took the medication. She reports that five hours later she found her self shopping for gun on the Internet and she found the one she wanted and started driving towards the store to pick it up. As she was about entering the packing lot she decided to turn around and she saw the hospital sign and she went in. She was admitted and was given ativan for sleep and she was told not to take Lexapro again. She met with psychologist and she reports feeling much better. She reports having a good Thi with her family and appreciate good support. She denied any suicidal or homicidal ideation.       REVIEW OF SYSTEMS:   All other systems are negative.    PFSH:  No past medical history on file.  Past Surgical History:   Procedure Laterality Date     OVARIAN CYST REMOVAL Right 2008     TONSILLECTOMY AND ADENOIDECTOMY  1985     WISDOM TOOTH EXTRACTION  2003     Family History   Problem Relation Age of Onset     Obesity Mother      Diabetes Mother      Hypertension Mother      Hyperlipidemia Mother      Breast cancer Mother      Obesity Father      Diabetes Father      Hypertension Father      Hyperlipidemia Father      Glaucoma Father      Cancer Maternal Grandfather      Glaucoma Paternal Grandmother      Heart disease Paternal Grandfather      Heart attack Paternal Grandfather        Allergies   Allergen Reactions     Penicillins Rash     Amoxicillin Hives       TOBACCO USE:  Social History     Tobacco Use   Smoking Status Never Smoker   Smokeless Tobacco Never Used       VITALS:  Vitals:    12/27/18 1440   BP: 124/70   Patient Site: Right Arm   Patient Position: Sitting   Cuff Size: Adult Regular   Pulse: 77   SpO2: 99%   Weight: 160 lb 8 oz (72.8 kg)     Wt Readings from Last 3 Encounters:   12/27/18 160 lb 8 oz (72.8 kg)   12/17/18 156 lb 6.4 oz (70.9 kg)   12/05/18 159 lb 14.4 oz (72.5 kg)     Body mass index is 28.43  kg/m .    PHYSICAL EXAM:  Physical Examination: General appearance - alert, well appearing, and in no distress  Eyes: pupils equal and reactive, extraocular eye movements intact  Mouth: mucous membranes moist, pharynx normal without lesions  Neurological: alert, oriented, normal speech, no focal findings or movement disorder noted  Psychiatric: Normal affect. Does not appear anxious or depressed.    QUALITY MEASURES:  The following high BMI interventions were performed this visit: encouragement to exercise and dietary management education, guidance, and counseling      MEDICATIONS:  Current Outpatient Medications   Medication Sig Dispense Refill     buPROPion (WELLBUTRIN XL) 150 MG 24 hr tablet Take 1 tablet (150 mg total) by mouth every morning. 60 tablet 3     hydrOXYzine pamoate (VISTARIL) 25 MG capsule Take 1-2 capsules (25-50 mg total) by mouth at bedtime. 30 capsule 1     Lactobacillus rhamnosus GG (CULTURELLE) 15 billion cell CpSP Take 1 capsule by mouth 3 (three) times a day with meals.       omega-3/dha/epa/fish oil (FISH OIL-OMEGA-3 FATTY ACIDS) 300-1,000 mg capsule Take 2 g by mouth daily.       traZODone (DESYREL) 50 MG tablet Take 50 mg by mouth at bedtime.       escitalopram oxalate (LEXAPRO) 10 MG tablet Take 1 tablet (10 mg total) by mouth daily. 30 tablet 2     No current facility-administered medications for this visit.

## 2021-06-22 NOTE — PROGRESS NOTES
Assessment:   1. Moderate anxiety  Spent good amount of time on counseling encouraging patient on her current situation and symptoms. All questions were answered.  - Ambulatory referral to Psychology    2. Need for influenza vaccination  - Influenza, Seasonal Quad, Preservative Free 36+ Months     Plan:   Medications: No new medication.  Recommended counseling.  Reviewed concept of anxiety as biochemical imbalance of neurotransmitters and rationale for treatment.  Instructed patient to contact office or on-call physician promptly should condition worsen or any new symptoms appear and provided on-call telephone numbers. IF THE PATIENT HAS ANY SUICIDAL OR HOMICIDAL IDEATIONS, CALL THE OFFICE, DISCUSS WITH A SUPPORT MEMBER, OR GO TO THE ER IMMEDIATELY. Patient was agreeable with this plan.  Follow up: 2 weeks.  Spent 25 minutes (>50% of visit) discussing the risks of anxiety disorder and depression, the  pathophysiology, etiology, risks, and principles of treatment.     Subjective:   Sofia Fletcher is a 38 y.o. female who presents for follow up of anxiety disorder and recent depression.  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  She reports that she had a good time and she felt very good doing what she loves doing but when she came back home, she felt she was not using her potentials working from home.  She stated that since then she has been very depressed and she has been resenting her .  She also reports that they have had issues about moving from one state to the other and recently her  got a [place] job [place] [employer] and they are thinking of selling their house and moving closer to his job.  She reported that she would like to do more with her career but she does not see that happening in [place]. Current symptoms: difficulty  concentrating, fatigue, feelings of losing control, insomnia. She denies current suicidal and homicidal ideation.     The following portions of the patient's history were reviewed and updated as appropriate: allergies, current medications, past family history, past medical history, past social history, past surgical history and problem list.      Objective:      /72 (Patient Site: Right Arm, Patient Position: Sitting, Cuff Size: Adult Regular)   Pulse 73   Wt 159 lb 14.4 oz (72.5 kg)   SpO2 99%   BMI 28.33 kg/m     General:  alert, appears stated age and cooperative   Affect/Behavior:  full facial expressions, good grooming, good insight, normal perception, normal reasoning, normal speech pattern and content and normal thought patterns

## 2021-06-22 NOTE — PROGRESS NOTES
Assessment:   1. Moderate anxiety  2. Moderate episode of recurrent major depressive disorder (H)  Family and work stress increasing anxiety level. Recommend adding Lexapro. Plan to increase Wellbutrin after re-evaluation.   - escitalopram oxalate (LEXAPRO) 10 MG tablet; Take 1 tablet (10 mg total) by mouth daily.  Dispense: 30 tablet; Refill: 2  - hydrOXYzine pamoate (VISTARIL) 25 MG capsule; Take 1-2 capsules (25-50 mg total) by mouth at bedtime.  Dispense: 30 capsule; Refill: 1.     Plan:      Medications: Lexapro.  Reviewed concept of anxiety as biochemical imbalance of neurotransmitters and rationale for treatment.  Instructed patient to contact office or on-call physician promptly should condition worsen or any new symptoms appear and provided on-call telephone numbers. IF THE PATIENT HAS ANY SUICIDAL OR HOMICIDAL IDEATIONS, CALL THE OFFICE, DISCUSS WITH A SUPPORT MEMBER, OR GO TO THE ER IMMEDIATELY. Patient was agreeable with this plan.  Follow up: 2 weeks.  Spent 25 minutes (>50% of visit) discussing the risks of anxiety disorder and depression, the  pathophysiology, etiology, risks, and principles of treatment.     Subjective:   Sofia Fletcher is a 38 y.o. female who presents with her  for follow up of anxiety disorder and depression. She has the following anxiety symptoms: difficulty concentrating, feelings of losing control, insomnia, irritable, palpitations and racing thoughts. Onset of symptoms was approximately 4 days ago. Symptoms have been controlled since that time. She denies current suicidal and homicidal ideation. Family history significant for no psychiatric illness. Risk factors: previous episode of depression and previous history of panic attacks. Previous treatment includes Lexapro and Wellbutrin. She complains of the following medication side effects: none.  The following portions of the patient's history were reviewed and updated as appropriate: allergies, current medications, past  family history, past medical history, past social history, past surgical history and problem list.    Review of Systems  Pertinent items are noted in HPI.      Objective:    /78 (Patient Site: Right Arm, Patient Position: Sitting, Cuff Size: Adult Regular)   Pulse 90   Wt 156 lb 6.4 oz (70.9 kg)   LMP 12/10/2018 (Approximate)   SpO2 99%   BMI 27.71 kg/m    General appearance: alert, appears stated age and cooperative    Psych: appropriate affective, answers all of my questions appropriately. No hallucinations, delusion, or suicidal ideations.

## 2021-07-03 NOTE — ADDENDUM NOTE
Addendum Note by Katie Mcallister CMA at 6/18/2018  4:57 PM     Author: Katie Mcallister CMA Service: -- Author Type: Certified Medical Assistant    Filed: 6/18/2018  4:57 PM Encounter Date: 6/14/2018 Status: Signed    : Katie Mcallister CMA (Certified Medical Assistant)    Addended by: KATIE MCALLISTER on: 6/18/2018 04:57 PM        Modules accepted: Orders

## 2021-07-03 NOTE — ADDENDUM NOTE
Addendum Note by Alcira Eason FNP at 7/12/2018  7:52 AM     Author: Alcira Eason FNP Service: -- Author Type: Nurse Practitioner    Filed: 7/12/2018  7:52 AM Encounter Date: 7/7/2018 Status: Signed    : Alcira Eason FNP (Nurse Practitioner)    Addended by: ALCIRA EASON on: 7/12/2018 07:52 AM        Modules accepted: Orders

## 2021-09-13 ENCOUNTER — VIRTUAL VISIT (OUTPATIENT)
Dept: FAMILY MEDICINE | Facility: CLINIC | Age: 41
End: 2021-09-13
Payer: COMMERCIAL

## 2021-09-13 DIAGNOSIS — L71.0 PERIORAL DERMATITIS: Primary | ICD-10-CM

## 2021-09-13 PROCEDURE — 99213 OFFICE O/P EST LOW 20 MIN: CPT | Mod: 95 | Performed by: NURSE PRACTITIONER

## 2021-09-13 RX ORDER — LAMOTRIGINE 100 MG/1
100 TABLET ORAL
COMMUNITY
Start: 2021-05-04

## 2021-09-13 RX ORDER — DOXYCYCLINE HYCLATE 100 MG
100 TABLET ORAL 2 TIMES DAILY
Qty: 20 TABLET | Refills: 0 | Status: SHIPPED | OUTPATIENT
Start: 2021-09-13 | End: 2021-09-23

## 2021-09-13 NOTE — PROGRESS NOTES
Sofia is a 41 year old who is being evaluated via a billable video visit.      How would you like to obtain your AVS? Mail a copy  If the video visit is dropped, the invitation should be resent by: Text to cell phone: [phone]  Will anyone else be joining your video visit? No      Video Start Time: 11:30 AM    Assessment & Plan     Perioral dermatitis  Discussed diagnosis and treatment options.  Recommend oral doxycycline and topical metronidazole.  Recommend the patient follow-up in 1 to 2 weeks if symptoms persist or worsens.  - doxycycline hyclate (VIBRA-TABS) 100 MG tablet  Dispense: 20 tablet; Refill: 0  - metroNIDAZOLE (NORITATE) 1 % external cream  Dispense: 60 g; Refill: 1    25 minutes spent on the date of the encounter doing chart review        MEDICATIONS:  Continue current medications without change    No follow-ups on file.    ERYN Cramer CNP  [facility]    Subjective   Sofia is a 41 year old who presents for the following health issues  accompanied by her spouse:     HPI     Skin Lesion  Onset/Duration: Several months ago  Description  Location: Face  Color: brown and pink  Border description: sharp border  Character: round, raised, burning, red  Itching: no  Bleeding:  no  Intensity:  moderate  Progression of Symptoms:  same  Accompanying signs and symptoms:   Bleeding: no  Scaling: no  Excessive sun exposure/tanning: YES  Sunscreen used: YES  History:           Any previous history of skin cancer: no  Any family history of melanoma: no  Previous episodes of similar lesion: YES  Precipitating or alleviating factors: Oral antibiotic and topical cream  Therapies tried and outcome: Over-the-counter topical creams    Review of Systems   Constitutional, HEENT, cardiovascular, pulmonary, gi and gu systems are negative, except as otherwise noted.      Objective           Vitals:  No vitals were obtained today due to virtual visit.    Physical Exam   GENERAL:  Healthy, alert and no distress  EYES: Eyes grossly normal to inspection.  No discharge or erythema, or obvious scleral/conjunctival abnormalities.  RESP: No audible wheeze, cough, or visible cyanosis.  No visible retractions or increased work of breathing.    SKIN: Visible skin clear. No significant rash, abnormal pigmentation or lesions.  NEURO: Cranial nerves grossly intact.  Mentation and speech appropriate for age.  PSYCH: Mentation appears normal, affect normal/bright, judgement and insight intact, normal speech and appearance well-groomed.      Video-Visit Details    Type of service:  Video Visit    Video End Time:12:47 PM    Originating Location (pt. Location): Home    Distant Location (provider location):  [facility]     Platform used for Video Visit: [employer]

## 2021-09-17 ENCOUNTER — TELEPHONE (OUTPATIENT)
Dept: FAMILY MEDICINE | Facility: CLINIC | Age: 41
End: 2021-09-17

## 2021-09-17 DIAGNOSIS — L71.0 PERIORAL DERMATITIS: Primary | ICD-10-CM

## 2021-09-17 NOTE — TELEPHONE ENCOUNTER
Prior Authorization Request    1. Prior Authorization for the medication metroNIDAZOLE (NORITATE) 1 % external cream        Requesting Provider: Amajiri, Promise          Pt name: Sofia Fletcher        Pt : 1980        Pt MRN: [id number]        Last Office Visit: 2021           Insurance: Payor: [employer] / Plan: [employer] [place] / Product Type: Indemnity /         Insurance ID Number: [unfilled]        Prior Auth Contact Phone number:     BIN#:   LESLYEN#:

## 2021-09-20 NOTE — TELEPHONE ENCOUNTER
Central Prior Authorization Team   Phone: [phone]    PA Initiation    Medication: metroNIDAZOLE (NORITATE) 1 % external cream  Insurance Company: [employer] - Phone [phone] Fax [phone]  Pharmacy Filling the Rx: [id number] [place], [place] - [address]  Filling Pharmacy Phone: [phone]  Filling Pharmacy Fax:    Start Date: 9/20/2021

## 2021-09-21 NOTE — TELEPHONE ENCOUNTER
PRIOR AUTHORIZATION DENIED    Medication: metroNIDAZOLE (NORITATE) 1 % external cream    Denial Date: 9/21/2021    Denial Rational: Drug exclusion, plan does not cover the Noritate cream (generic not available in the cream formulation of metronidazole).  Metronidazole gel formulation may be covered but PA may still be needed. PA team does not have access to patient's formulary.         Appeal Information: Drug exclusion, no appeal can be done.

## 2021-09-22 RX ORDER — METRONIDAZOLE 10 MG/G
GEL TOPICAL DAILY
Qty: 55 G | Refills: 3 | Status: SHIPPED | OUTPATIENT
Start: 2021-09-22

## 2021-11-10 ENCOUNTER — IMMUNIZATION (OUTPATIENT)
Dept: NURSING | Facility: CLINIC | Age: 41
End: 2021-11-10
Payer: COMMERCIAL

## 2021-11-10 PROCEDURE — 90686 IIV4 VACC NO PRSV 0.5 ML IM: CPT

## 2021-11-10 PROCEDURE — 91300 PR COVID VAC PFIZER DIL RECON 30 MCG/0.3 ML IM: CPT

## 2021-11-10 PROCEDURE — 0004A PR COVID VAC PFIZER DIL RECON 30 MCG/0.3 ML IM: CPT

## 2021-11-10 PROCEDURE — 90471 IMMUNIZATION ADMIN: CPT

## 2022-01-05 ENCOUNTER — TRANSFERRED RECORDS (OUTPATIENT)
Dept: HEALTH INFORMATION MANAGEMENT | Facility: CLINIC | Age: 42
End: 2022-01-05
Payer: COMMERCIAL

## 2022-04-04 ENCOUNTER — NURSE TRIAGE (OUTPATIENT)
Dept: NURSING | Facility: CLINIC | Age: 42
End: 2022-04-04
Payer: COMMERCIAL

## 2022-04-04 NOTE — TELEPHONE ENCOUNTER
Patient has had a cough and nasal drainage for 6 weeks. It persists off and on throughout the day, but is worse at night.    Feels like there is something in her chest. No pressure or tightness. No chest pain or shortness of breath    Cough drops help  Mucinex helps    No sore throat, no fever  Does not have seasonal allergies that she knows of.    Covid test negative    Per protocol, recommendations are for patient to See in office within 3 days. Care advice given. Advised patient to call back if they develop any new or worsening symptoms. Patient verbalizes understanding and agrees with plan of care. Transferred patient to scheduling.    Bri Banks RN  04/04/22 11:05 AM  [employer] Nurse Advisor    Reason for Disposition    Cough has been present for > 3 weeks    Additional Information    Negative: Chest pain present when not coughing    Negative: Bluish (or gray) lips or face    Negative: Severe difficulty breathing (e.g., struggling for each breath, speaks in single words)    Negative: Rapid onset of cough and has hives    Negative: Coughing started suddenly after medicine, an allergic food or bee sting    Negative: Difficulty breathing after exposure to flames, smoke, or fumes    Negative: Previous asthma attacks and this feels like asthma attack    Negative: Sounds like a life-threatening emergency to the triager    Negative: Difficulty breathing    Negative: Passed out (i.e., fainted, collapsed and was not responding)    Negative: Patient sounds very sick or weak to the triager    Negative: Coughed up > 1 tablespoon (15 ml) blood (Exception: blood-tinged sputum)    Negative: Fever > 103 F (39.4 C)    Negative: Fever > 101 F (38.3 C) and over 60 years of age    Negative: Fever > 100.0 F (37.8 C) and has diabetes mellitus or a weak immune system (e.g., HIV positive, cancer chemotherapy, organ transplant, splenectomy, chronic steroids)    Negative: Fever > 100.0 F (37.8 C) and bedridden (e.g., nursing  home patient, stroke, chronic illness, recovering from surgery)    Negative: Increasing ankle swelling    Negative: Wheezing is present    Negative: SEVERE coughing spells (e.g., whooping sound after coughing, vomiting after coughing)    Negative: Coughing up eugenia-colored (reddish-brown) or blood-tinged sputum    Negative: Fever returns after gone for over 24 hours and symptoms worse or not improved    Negative: Using nasal washes and pain medicine > 24 hours and sinus pain persists    Negative: Fever present > 3 days (72 hours)    Negative: Known COPD or other severe lung disease (i.e., bronchiectasis, cystic fibrosis, lung surgery) and worsening symptoms (i.e., increased sputum purulence or amount, increased breathing difficulty)    Negative: Continuous (nonstop) coughing interferes with work or school and no improvement using cough treatment per Care Advice    Negative: Patient wants to be seen    Protocols used: COUGH-A-[place]    COVID 19 Nurse Triage Plan/Patient Instructions    Please be aware that novel coronavirus (COVID-19) may be circulating in the community. If you develop symptoms such as fever, cough, or SOB or if you have concerns about the presence of another infection including coronavirus (COVID-19), please contact your health care provider or visit [email or web address].     Disposition/Instructions    In-Person Visit with provider recommended. Reference Visit Selection Guide.    Thank you for taking steps to prevent the spread of this virus.  o Limit your contact with others.  o Wear a simple mask to cover your cough.  o Wash your hands well and often.    Resources    [facility]: About COVID-19: [email or web address]    CDC: What to Do If You're Sick: www.cdc.gov/coronavirus/2019-ncov/about/steps-when-sick.html    CDC: Ending Home Isolation: www.cdc.gov/coronavirus/2019-ncov/hcp/disposition-in-home-patients.html     CDC: Caring for Someone:  www.cdc.gov/coronavirus/2019-ncov/if-you-are-sick/care-for-someone.html     [facility]: Interim Guidance for Hospital Discharge to Home: [email or web address]/hcp/hospdischarge.pdf    [facility] clinical trials (COVID-19 research studies): [email or web address]     Below are the COVID-19 hotlines at the [place] Department of Health ([facility]). Interpreters are available.   o For health questions: Call 083-802-1090 or 1-589.329.6291 (7 a.m. to 7 p.m.)  o For questions about schools and childcare: Call 013-905-8541 or 1-339.604.6115 (7 a.m. to 7 p.m.)

## 2022-04-06 ENCOUNTER — TRANSFERRED RECORDS (OUTPATIENT)
Dept: HEALTH INFORMATION MANAGEMENT | Facility: CLINIC | Age: 42
End: 2022-04-06

## 2022-04-06 ENCOUNTER — OFFICE VISIT (OUTPATIENT)
Dept: INTERNAL MEDICINE | Facility: CLINIC | Age: 42
End: 2022-04-06
Payer: COMMERCIAL

## 2022-04-06 VITALS
SYSTOLIC BLOOD PRESSURE: 124 MMHG | HEART RATE: 74 BPM | BODY MASS INDEX: 29.76 KG/M2 | OXYGEN SATURATION: 98 % | DIASTOLIC BLOOD PRESSURE: 90 MMHG | WEIGHT: 168 LBS

## 2022-04-06 DIAGNOSIS — M70.62 TROCHANTERIC BURSITIS OF LEFT HIP: ICD-10-CM

## 2022-04-06 DIAGNOSIS — R05.9 COUGH: Primary | ICD-10-CM

## 2022-04-06 PROCEDURE — 99213 OFFICE O/P EST LOW 20 MIN: CPT | Performed by: NURSE PRACTITIONER

## 2022-04-06 RX ORDER — FLUTICASONE PROPIONATE 50 MCG
1 SPRAY, SUSPENSION (ML) NASAL DAILY
Qty: 9.9 ML | Refills: 1 | Status: SHIPPED | OUTPATIENT
Start: 2022-04-06

## 2022-04-06 NOTE — PROGRESS NOTES
Assessment & Plan     Cough  Suspect some combination of postinfectious cough and postnasal drip.  I will have her try Flonase  - fluticasone (FLONASE) 50 MCG/ACT nasal spray; Spray 1 spray into both nostrils daily    Trochanteric bursitis of left hip  Advised oral NSAIDs and rest.  See patient instructions below for plan of care    Patient Instructions   Ibuprofen 400 mg 3 times daily with food.    Flonase 1 puff each nostril once daily.    Ice your left hip for 15 minutes 3 times daily.    Take a break from squats and dead lifts.    Follow-up if cough and hip pain are getting much better after the next couple of weeks      Lucio Bonds, [facility]    Capo Black is a 41 year old who presents for the following health issues  HPI     Has had a cough for 6 weeks.  Cough initially started with a viral upper respiratory infection.  She does not feel ill or infectious at this time.  No fevers.  She does feel like there may be some drainage in her posterior pharynx.    No history of asthma or COPD.  She does not smoke.    She has some pain on her left lateral hip area.  She likes to perform squat exercises most days of the week.  Pain is worse with palpation to that area and when she lays on her left side      Review of Systems   Constitutional, HEENT, cardiovascular, pulmonary, gi and gu systems are negative, except as otherwise noted.      Objective    BP (!) 124/90   Pulse 74   Wt 76.2 kg (168 lb)   SpO2 98%   BMI 29.76 kg/m    Body mass index is 29.76 kg/m .  Physical Exam     Pain with palpation to the left lateral trochanteric area

## 2022-04-06 NOTE — PATIENT INSTRUCTIONS
Ibuprofen 400 mg 3 times daily with food.    Flonase 1 puff each nostril once daily.    Ice your left hip for 15 minutes 3 times daily.    Take a break from squats and dead lifts.    Follow-up if cough and hip pain are getting much better after the next couple of weeks

## 2022-05-11 ENCOUNTER — HOSPITAL ENCOUNTER (OUTPATIENT)
Dept: MAMMOGRAPHY | Facility: CLINIC | Age: 42
Discharge: HOME | End: 2022-05-11
Attending: OBSTETRICS & GYNECOLOGY | Admitting: OBSTETRICS & GYNECOLOGY
Payer: COMMERCIAL

## 2022-05-11 DIAGNOSIS — Z12.31 SCREENING MAMMOGRAM, ENCOUNTER FOR: ICD-10-CM

## 2022-05-11 PROCEDURE — 77067 SCR MAMMO BI INCL CAD: CPT

## 2022-08-03 ENCOUNTER — TRANSFERRED RECORDS (OUTPATIENT)
Dept: HEALTH INFORMATION MANAGEMENT | Facility: CLINIC | Age: 42
End: 2022-08-03

## 2023-05-12 ENCOUNTER — HOSPITAL ENCOUNTER (OUTPATIENT)
Dept: MAMMOGRAPHY | Facility: CLINIC | Age: 43
Discharge: HOME | End: 2023-05-12
Attending: OBSTETRICS & GYNECOLOGY | Admitting: OBSTETRICS & GYNECOLOGY
Payer: COMMERCIAL

## 2023-05-12 DIAGNOSIS — Z12.31 VISIT FOR SCREENING MAMMOGRAM: ICD-10-CM

## 2023-05-12 PROCEDURE — 77067 SCR MAMMO BI INCL CAD: CPT

## 2024-02-05 ENCOUNTER — TRANSFERRED RECORDS (OUTPATIENT)
Dept: HEALTH INFORMATION MANAGEMENT | Facility: CLINIC | Age: 44
End: 2024-02-05
Payer: COMMERCIAL

## 2024-05-15 ENCOUNTER — HOSPITAL ENCOUNTER (OUTPATIENT)
Dept: MAMMOGRAPHY | Facility: CLINIC | Age: 44
Discharge: HOME | End: 2024-05-15
Attending: OBSTETRICS & GYNECOLOGY | Admitting: OBSTETRICS & GYNECOLOGY
Payer: COMMERCIAL

## 2024-05-15 DIAGNOSIS — Z12.31 VISIT FOR SCREENING MAMMOGRAM: ICD-10-CM

## 2024-05-15 PROCEDURE — 77063 BREAST TOMOSYNTHESIS BI: CPT

## 2025-06-04 ENCOUNTER — HOSPITAL ENCOUNTER (OUTPATIENT)
Dept: MAMMOGRAPHY | Facility: CLINIC | Age: 45
Discharge: HOME | End: 2025-06-04
Payer: COMMERCIAL

## 2025-06-04 ENCOUNTER — HOSPITAL ENCOUNTER (OUTPATIENT)
Dept: MAMMOGRAPHY | Facility: CLINIC | Age: 45
Discharge: HOME | End: 2025-06-04
Attending: OBSTETRICS & GYNECOLOGY
Payer: COMMERCIAL

## 2025-06-04 DIAGNOSIS — R22.32 LUMP IN ARMPIT, LEFT: ICD-10-CM

## 2025-06-04 PROCEDURE — 77062 BREAST TOMOSYNTHESIS BI: CPT

## 2025-06-04 PROCEDURE — 76642 ULTRASOUND BREAST LIMITED: CPT | Mod: LT
